# Patient Record
Sex: FEMALE | Race: WHITE | NOT HISPANIC OR LATINO | Employment: OTHER | URBAN - METROPOLITAN AREA
[De-identification: names, ages, dates, MRNs, and addresses within clinical notes are randomized per-mention and may not be internally consistent; named-entity substitution may affect disease eponyms.]

---

## 2018-04-23 ENCOUNTER — APPOINTMENT (EMERGENCY)
Dept: RADIOLOGY | Facility: HOSPITAL | Age: 83
End: 2018-04-23
Payer: MEDICARE

## 2018-04-23 ENCOUNTER — HOSPITAL ENCOUNTER (EMERGENCY)
Facility: HOSPITAL | Age: 83
Discharge: HOME | End: 2018-04-23
Attending: EMERGENCY MEDICINE | Admitting: EMERGENCY MEDICINE
Payer: MEDICARE

## 2018-04-23 VITALS
TEMPERATURE: 99.2 F | OXYGEN SATURATION: 93 % | WEIGHT: 105.16 LBS | DIASTOLIC BLOOD PRESSURE: 76 MMHG | SYSTOLIC BLOOD PRESSURE: 123 MMHG | RESPIRATION RATE: 18 BRPM | HEART RATE: 72 BPM
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
## 2018-04-23 DIAGNOSIS — S20.221A CONTUSION OF RIGHT UPPER BACK EXCLUDING SCAPULAR REGION, INITIAL ENCOUNTER: ICD-10-CM

## 2018-04-23 DIAGNOSIS — S70.01XA CONTUSION OF RIGHT HIP, INITIAL ENCOUNTER: ICD-10-CM

## 2018-04-23 PROCEDURE — 99283 EMERGENCY DEPT VISIT LOW MDM: CPT

## 2018-04-23 PROCEDURE — 73502 X-RAY EXAM HIP UNI 2-3 VIEWS: CPT

## 2018-04-23 PROCEDURE — 73030 X-RAY EXAM OF SHOULDER: CPT

## 2018-04-23 RX ORDER — ACETAMINOPHEN 325 MG/1
650 TABLET ORAL ONCE
Status: COMPLETED | OUTPATIENT
Start: 2018-04-23 | End: 2018-04-23

## 2018-04-23 RX ADMIN — ACETAMINOPHEN 650 MG: 325 TABLET, FILM COATED ORAL at 17:50

## 2018-04-23 NOTE — DISCHARGE INSTRUCTIONS
Diagnosis: fall/ ruight hip contusion/ right upper back contusion     - activity as tolerated    - she had negative pelvis/ right hip and r shoulder xrays

## 2018-04-23 NOTE — ED PROVIDER NOTES
History  Chief Complaint   Patient presents with    Fall     Pt brought to ER via EMS from [facility] with c/o right shoulder and right hip pain s/p falling out of low-level bed onto right side x 1 hr  EMS denies LOC  Pt denies c-spine tenderness with palp     80 yr female with  demeiita from long term care facility states fell from low level bed with r shoulder/ hip injury -- pt has no comps        History provided by:  Nursing home  History limited by:  Dementia   used: No        None       Past Medical History:   Diagnosis Date    Abnormal posture     Constipation     Dementia with behavioral disturbance     Disease of thyroid gland     Hypertension     Major depression     Osteoporosis     Psychiatric disorder        History reviewed  No pertinent surgical history  History reviewed  No pertinent family history  I have reviewed and agree with the history as documented  Social History   Substance Use Topics    Smoking status: Never Smoker    Smokeless tobacco: Never Used    Alcohol use No        Review of Systems   Constitutional: Negative  HENT: Negative  Eyes: Negative for photophobia, pain, discharge, redness, itching and visual disturbance  Respiratory: Negative  Cardiovascular: Negative  Gastrointestinal: Negative  Endocrine: Negative  Genitourinary: Negative  Musculoskeletal: Negative  Skin: Positive for wound  Negative for color change, pallor and rash  Allergic/Immunologic: Negative  Neurological: Negative  Hematological: Negative  Psychiatric/Behavioral: Negative          Physical Exam  ED Triage Vitals [04/23/18 1706]   Temperature Pulse Respirations Blood Pressure SpO2   99 2 °F (37 3 °C) 79 18 132/87 93 %      Temp Source Heart Rate Source Patient Position - Orthostatic VS BP Location FiO2 (%)   Oral Monitor Lying Right arm --      Pain Score       --           Orthostatic Vital Signs  Vitals:    04/23/18 1706   BP: 132/87   Pulse: 79 Patient Position - Orthostatic VS: Lying       Physical Exam   Constitutional: No distress  avss-- pulse ox 93 % on ra- intepretation is low- normal- no intervention- cachetic   HENT:   Head: Normocephalic and atraumatic  Eyes: Conjunctivae and EOM are normal  Pupils are equal, round, and reactive to light  Right eye exhibits no discharge  Left eye exhibits no discharge  No scleral icterus  Mm pink   Neck: Normal range of motion  Neck supple  No JVD present  No tracheal deviation present  No thyromegaly present  No pmt c/t/l/s spine   Cardiovascular: Normal rate, regular rhythm, normal heart sounds and intact distal pulses  Exam reveals no friction rub  No murmur heard  Pulmonary/Chest: Effort normal and breath sounds normal  No stridor  No respiratory distress  She has no wheezes  She has no rales  She exhibits no tenderness  Abdominal: Soft  Bowel sounds are normal  She exhibits no distension and no mass  There is no tenderness  There is no rebound and no guarding  No hernia  Musculoskeletal: Normal range of motion  She exhibits no edema, tenderness or deformity  r hip-- small lateral area of ecchymosis- nt at hip with normal rom -- r shoulder- small posteriro area of ecchymosis- nt- normal rom at shoulder- nt-    Lymphadenopathy:     She has no cervical adenopathy  Neurological: She is alert  No cranial nerve deficit or sensory deficit  She exhibits normal muscle tone  A and o xx 1    Skin: Skin is warm  Capillary refill takes less than 2 seconds  No rash noted  She is not diaphoretic  No erythema  No pallor  Psychiatric: She has a normal mood and affect  Her behavior is normal    Nursing note and vitals reviewed        ED Medications  Medications   acetaminophen (TYLENOL) tablet 650 mg (650 mg Oral Given 4/23/18 1750)       Diagnostic Studies  Results Reviewed     None                 XR shoulder 2+ views RIGHT   ED Interpretation by Suzette Eden MD (04/23 5512)   nad      XR hip/pelv 2-3 vws right   ED Interpretation by Alba Freed MD (04/23 1832)   No fx                 Procedures  Procedures       Phone Contacts  ED Phone Contact    ED Course  ED Course as of Apr 23 1833 Mon Apr 23, 2018 1832 R shoulder xray - no evidence of fx/ dislocation/ separation     - pelvis/ r hip xray - djd- osteopenia- no evidence of fx                                 MDM  CritCare Time    Disposition  Final diagnoses:   None     ED Disposition     None      Follow-up Information    None       Patient's Medications    No medications on file     No discharge procedures on file      ED Provider  Electronically Signed by           Alba Freed MD  04/25/18 7632